# Patient Record
Sex: MALE
[De-identification: names, ages, dates, MRNs, and addresses within clinical notes are randomized per-mention and may not be internally consistent; named-entity substitution may affect disease eponyms.]

---

## 2023-07-05 ENCOUNTER — HOSPITAL ENCOUNTER (EMERGENCY)
Dept: HOSPITAL 33 - ED | Age: 33
Discharge: HOME | End: 2023-07-05
Payer: COMMERCIAL

## 2023-07-05 VITALS — SYSTOLIC BLOOD PRESSURE: 112 MMHG | DIASTOLIC BLOOD PRESSURE: 68 MMHG

## 2023-07-05 VITALS — HEART RATE: 89 BPM | OXYGEN SATURATION: 98 %

## 2023-07-05 DIAGNOSIS — Z72.0: ICD-10-CM

## 2023-07-05 DIAGNOSIS — S81.832A: Primary | ICD-10-CM
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
## 2023-07-05 DIAGNOSIS — Z28.310: ICD-10-CM
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
## 2023-07-05 LAB
ALBUMIN SERPL-MCNC: 4 G/DL (ref 3.5–5)
ALP SERPL-CCNC: 64 U/L (ref 38–126)
ALT SERPL-CCNC: 31 U/L (ref 0–50)
ANION GAP SERPL CALC-SCNC: 13 MEQ/L (ref 5–15)
AST SERPL QL: 30 U/L (ref 17–59)
BASOPHILS # BLD AUTO: 0.07 X10^3/UL (ref 0–0.4)
BASOPHILS NFR BLD AUTO: 0.7 % (ref 0–0.4)
BILIRUB BLD-MCNC: 0.6 MG/DL (ref 0.2–1.3)
BUN SERPL-MCNC: 13 MG/DL (ref 9–20)
CALCIUM SPEC-MCNC: 8.3 MG/DL (ref 8.4–10.2)
CHLORIDE SERPL-SCNC: 105 MMOL/L (ref 98–107)
CO2 SERPL-SCNC: 24 MMOL/L (ref 22–30)
CREAT SERPL-MCNC: 0.99 MG/DL (ref 0.66–1.25)
EOSINOPHIL # BLD AUTO: 0.05 X10^3/UL (ref 0–0.5)
GFR SERPLBLD BASED ON 1.73 SQ M-ARVRAT: > 60 ML/MIN
GLUCOSE SERPL-MCNC: 110 MG/DL (ref 74–106)
HCT VFR BLD AUTO: 44.9 % (ref 42–50)
HGB BLD-MCNC: 14.8 G/DL (ref 12.5–18)
IMM GRANULOCYTES # BLD: 0.03 X10^3U/L (ref 0–0.03)
IMM GRANULOCYTES NFR BLD: 0.3 % (ref 0–0.4)
LYMPHOCYTES # SPEC AUTO: 1.48 X10^3/UL (ref 1–4.6)
MCH RBC QN AUTO: 29.9 PG (ref 26–32)
MCHC RBC AUTO-ENTMCNC: 33 G/DL (ref 32–36)
MONOCYTES # BLD AUTO: 0.77 X10^3/UL (ref 0–1.3)
NRBC # BLD AUTO: 0 X10^3U/L (ref 0–0.01)
NRBC BLD AUTO-RTO: 0 % (ref 0–0.1)
PLATELET # BLD AUTO: 238 X10^3/UL (ref 150–450)
POTASSIUM SERPLBLD-SCNC: 4 MMOL/L (ref 3.5–5.1)
PROT SERPL-MCNC: 7.2 G/DL (ref 6.3–8.2)
RBC # BLD AUTO: 4.95 X10^6/UL (ref 4.1–5.6)
SODIUM SERPL-SCNC: 138 MMOL/L (ref 137–145)
WBC # BLD AUTO: 9.5 X10^3/UL (ref 4–10.5)

## 2023-07-05 PROCEDURE — 36000 PLACE NEEDLE IN VEIN: CPT

## 2023-07-05 PROCEDURE — 73590 X-RAY EXAM OF LOWER LEG: CPT

## 2023-07-05 PROCEDURE — 83605 ASSAY OF LACTIC ACID: CPT

## 2023-07-05 PROCEDURE — 87040 BLOOD CULTURE FOR BACTERIA: CPT

## 2023-07-05 PROCEDURE — 99284 EMERGENCY DEPT VISIT MOD MDM: CPT

## 2023-07-05 PROCEDURE — 80053 COMPREHEN METABOLIC PANEL: CPT

## 2023-07-05 PROCEDURE — 85025 COMPLETE CBC W/AUTO DIFF WBC: CPT

## 2023-07-05 PROCEDURE — 36415 COLL VENOUS BLD VENIPUNCTURE: CPT

## 2023-07-05 PROCEDURE — 96365 THER/PROPH/DIAG IV INF INIT: CPT

## 2023-07-05 PROCEDURE — 87070 CULTURE OTHR SPECIMN AEROBIC: CPT

## 2023-07-05 NOTE — ERPHSYRPT
- History of Present Illness


Time Seen by Provider: 07/05/23 13:30


Patient Subjective Stated Complaint: Left leg pain/injury


Triage Nursing Assessment: Patient ambulated back to ED and transferred self to 

bed. Patient A+O X 3. Patient's skin pink, warm and dry. Patient states on 

Friday while at work a piece of rebar hit him in the left shin. Patient was seen

in [facility] at [facility] and was dx with cellulitis and prescribed Bactrim 

and Bactroban and was given a tetanus. Patient states the area is bigger and is 

causing pain to move up his leg 8/10. Patient has wound noted to middle of shin 

that is red, warm with black center.


Physician History: 





Patient is a 33-year-old  male who presents after an injury last Friday or

5 days ago which occurred at work.  He was hit in the left thigh over the tibia 

mid shaft with a piece of rebar.  He was seen at [facility] in the quick care 

clinic and was given Bactroban and Septra the wound has gotten more inflamed and

larger and pain has is now radiating up the thigh he is also had a fever to 103 

apparently.


Method of Injury: direct blow


Occurred: days ago (5)


Quality: intermittent, aching


Severity of Pain-Max: moderate


Severity of Pain-Current: moderate


Lower Extremities Pain: leg: left


Allergies/Adverse Reactions: 








No Known Drug Allergies Allergy (Unverified 07/05/23 13:16)


   





Hx Tetanus, Diphtheria Vaccination/Date Given: Yes (2023)


Hx Influenza Vaccination/Date Given: Yes


Hx Pneumococcal Vaccination/Date Given: No


Immunizations Up to Date: Yes





Travel Risk





- International Travel


Have you traveled outside of the country in past 3 weeks: No





- Coronavirus Screening


Are you exhibiting any of the following symptoms?: No


Close contact with a COVID-19 positive Pt in past 14-21 Days: No





- Vaccine Status


Have you recieved a Covid-19 vaccination: No





- Review of Systems


Constitutional: No Fever, No Chills


Eyes: No Symptoms


Ears, Nose, & Throat: No Symptoms


Respiratory: No Cough, No Dyspnea


Cardiac: No Chest Pain, No Edema, No Syncope


Abdominal/Gastrointestinal: No Abdominal Pain, No Nausea, No Vomiting, No 

Diarrhea


Genitourinary Symptoms: No Dysuria


Musculoskeletal: No Back Pain, No Neck Pain


Skin: No Rash


Neurological: No Dizziness, No Focal Weakness, No Sensory Changes


Psychological: No Symptoms


Endocrine: No Symptoms


All Other Systems: Reviewed and Negative





- Past Medical History


Pertinent Past Medical History: No


Neurological History: No Pertinent History


ENT History: No Pertinent History


Cardiac History: No Pertinent History


Respiratory History: No Pertinent History


Endocrine Medical History: No Pertinent History


Musculoskeletal History: No Pertinent History


GI Medical History: No Pertinent History


 History: No Pertinent History


Psycho-Social History: No Pertinent History


Male Reproductive Disorders: No Pertinent History





- Past Surgical History


Past Surgical History: Yes


Neuro Surgical History: No Pertinent History


Cardiac: No Pertinent History


Respiratory: No Pertinent History


Gastrointestinal: No Pertinent History


Genitourinary: No Pertinent History


Musculoskeletal: Orthopedic Surgery


Male Surgical History: No Pertinent History


Other Surgical History: Right elbow surgery





- Social History


Smoking Status: Current every day smoker


How long have you smoked: years


Exposure to second hand smoke: Yes


Drug Use: none


Patient Lives Alone: No





- Nursing Vital Signs


Nursing Vital Signs: 


                               Initial Vital Signs











Temperature  98.3 F   07/05/23 13:17


 


Pulse Rate  96 H  07/05/23 13:17


 


Respiratory Rate  18   07/05/23 13:17


 


Blood Pressure  121/86   07/05/23 13:17


 


O2 Sat by Pulse Oximetry  100   07/05/23 13:17








                                   Pain Scale











Pain Intensity                 7

















- Physical Exam


General Appearance: mild distress


Eyes, Ears, Nose, Throat Exam: moist mucous membranes


Neck Exam: non-tender, supple


Cardiovascular/Respiratory Exam: chest non-tender, normal breath sounds, regular

rate/rhythm, no respiratory distress


Gastrointestinal/Abdominal Exam: non-tender, guarding


Back Exam: normal inspection, No vertebral tenderness


Hips Exam: left: soft tissue tenderness (Tenderness and enlarged lymph node in 

the left inguinal area), bilateral: non-tender


Legs Exam: left leg: soft tissue tenderness (Ulcerated area midshaft right tibia

with erythema and ulceration), swelling


Knees Exam: bilateral knee: non-tender, normal inspection


Ankle Exam: bilateral ankle: non-tender, normal inspection, normal range of 

motion


Foot Exam: bilateral foot: non-tender, normal inspection, normal range of motion


Neuro/Tendon Exam: normal sensation, normal motor functions


Mental Status Exam: alert, oriented x 3, cooperative


Skin Exam: normal color, warm, dry


**SpO2 Interpretation**: normal


SpO2: 98


O2 Delivery: Room Air





- Course


Nursing assessment & vital signs reviewed: Yes





- Radiology Exams


  ** Left Lower Leg


X-ray Interpretation: Interpreted by me, Reviewed by me


Ordered Tests: 


                               Active Orders 24 hr











 Category Date Time Status


 


 IV Insertion STAT Care  07/05/23 13:52 Active


 


 LOWER LEG Stat Exams  07/05/23 13:48 Taken


 


 BLOOD CULTURE Stat Lab  07/05/23 13:55 Received


 


 CBC W DIFF Stat Lab  07/05/23 14:15 Completed


 


 CMP Stat Lab  07/05/23 14:15 Completed


 


 CULTURE,WOUND Stat Lab  07/05/23 13:52 Received


 


 Lactic Acid Stat Lab  07/05/23 13:47 Completed








Medication Summary














Discontinued Medications














Generic Name Dose Route Start Last Admin





  Trade Name Freq  PRN Reason Stop Dose Admin


 


Sodium Chloride  1,000 mls @ 999 mls/hr  07/05/23 13:47  07/05/23 14:04





  Sodium Chloride 0.9% 1000 Ml  IV  07/05/23 14:47  999 mls/hr





  .Q1H1M STA   Administration


 


Ceftriaxone Sodium/Dextrose  2 g in 50 mls @ 100 mls/hr  07/05/23 13:49  

07/05/23 14:46





  Rocephin 2 Gm-D5w 50ml Bag**  IV  07/05/23 14:18  Infused





  STAT STA   Infusion


 


Sodium Chloride  Confirm  07/05/23 14:01 





  Sodium Chloride 0.9% 1000 Ml  Administered  07/05/23 14:02 





  Dose  





  1,000 mls @ ud  





  .ROUTE  





  .STK-MED ONE  


 


Ceftriaxone Sodium/Dextrose  Confirm  07/05/23 14:01 





  Rocephin 2 Gm-D5w 50ml Bag**  Administered  07/05/23 14:02 





  Dose  





  2 g in 50 mls @ ud  





  IV  





  .STK-MED ONE  











Lab/Rad Data: 


                           Laboratory Result Diagrams





                                 07/05/23 14:15 





                                 07/05/23 14:15 





                               Laboratory Results











  07/05/23 07/05/23 07/05/23 Range/Units





  14:15 14:15 13:47 


 


WBC   9.5   (4.0-10.5)  x10^3/uL


 


RBC   4.95   (4.1-5.6)  x10^6/uL


 


Hgb   14.8   (12.5-18.0)  g/dL


 


Hct   44.9   (42-50)  %


 


MCV   90.7   ()  fL


 


MCH   29.9   (26-32)  pg


 


MCHC   33.0   (32-36)  g/dL


 


RDW   13.2   (11.5-14.0)  %


 


Plt Count   238   (150-450)  x10^3/uL


 


MPV   10.1   (7.5-11.0)  fL


 


Gran %   74.9 H   (36.0-66.0)  %


 


Immature Gran % (Auto)   0.3   (0.00-0.4)  %


 


Nucleat RBC Rel Count   0.0   (0.00-0.1)  %


 


Eos # (Auto)   0.05   (0-0.5)  x10^3/uL


 


Immature Gran # (Auto)   0.03   (0.00-0.03)  x10^3u/L


 


Absolute Lymphs (auto)   1.48   (1.0-4.6)  x10^3/uL


 


Absolute Monos (auto)   0.77   (0.0-1.3)  x10^3/uL


 


Absolute Nucleated RBC   0.00   (0.00-0.01)  x10^3u/L


 


Lymphocytes %   15.5 L   (24.0-44.0)  %


 


Monocytes %   8.1   (0.0-12.0)  %


 


Eosinophils %   0.5   (0.00-5.0)  %


 


Basophils %   0.7   (0.0-0.4)  %


 


Absolute Granulocytes   7.14 H   (1.4-6.9)  x10^3/uL


 


Basophils #   0.07   (0-0.4)  x10^3/uL


 


Sodium  138    (137-145)  mmol/L


 


Potassium  4.0    (3.5-5.1)  mmol/L


 


Chloride  105    ()  mmol/L


 


Carbon Dioxide  24    (22-30)  mmol/L


 


Anion Gap  13.0    (5-15)  MEQ/L


 


BUN  13    (9-20)  mg/dL


 


Creatinine  0.99    (0.66-1.25)  mg/dL


 


Estimated GFR  > 60.0    ML/MIN


 


Glucose  110 H    ()  mg/dL


 


Lactic Acid    1.2  (0.4-2.0)  


 


Calcium  8.3 L    (8.4-10.2)  mg/dL


 


Total Bilirubin  0.60    (0.2-1.3)  mg/dL


 


AST  30    (17-59)  U/L


 


ALT  31    (0-50)  U/L


 


Alkaline Phosphatase  64    ()  U/L


 


Serum Total Protein  7.2    (6.3-8.2)  g/dL


 


Albumin  4.0    (3.5-5.0)  g/dL














- Progress


Progress: unchanged


Progress Note: 





07/05/23 14:58


Patient will be continued on Bactrim and will also receive Keflex.  There is no 

evidence of osteomyelitis or foreign body or fracture.


Counseled pt/family regarding: diagnosis, need for follow-up, rad results





Medical Desision Making





- Independent Historian


Additional History obtained from: Relative/friend





- Diagnostic Testing


Diagnostic test were ordered, analyzed, and reviewed by me: Yes


Radiological Interpretation: Interpreted by me, Reviewed by me





- Risk of complications


The pt has a mod risk of morbidity or mortality based on: Need for prescription 

drug management





- Departure


Departure Disposition: Home


Clinical Impression: 


 Puncture wound of left lower leg





Condition: Stable


Critical Care Time: No


Referrals: 


DOCTOR,NO FAMILY [Primary Care Provider] - Follow up/PCP as directed


Instructions:  Wound Care (DC)


Prescriptions: 


Hydrocodone/Acetaminophen [Hydrocodone-Acetamin 5-325 mg***] 1 tab PO Q6HPRN PRN

3 Days #12 tablet MDD 4


 PRN Reason: Pain


Cephalexin Mh 500 mg** [Keflex 500 mg**] 500 mg PO Q6H 10 Days #40 cap

## 2023-07-06 NOTE — XRAY
Indication: Swelling.  Injury with metal.  Foreign body.



Comparison: None



2 view left lower leg negative for radiopaque foreign body.  No bony,

articular, or soft tissue abnormalities.